# Patient Record
(demographics unavailable — no encounter records)

---

## 2025-04-29 NOTE — ASSESSMENT
[FreeTextEntry1] : In summary, Ms. MG presents with left lower extremity varicose veins. A venous duplex was performed in the office today and showed no evidence of DVT or SVT in either extremity. There is reflux in the left small saphenous vein. We will begin treatment with conservative management. I provided her a prescription for knee high 20-30 mmHg compression stockings and instructed her to wear these daily and remove at night. She should keep her legs elevated when possible and exercise regularly. She will return to follow up in September and if symptoms persist, we will proceed with radiofrequency ablation of the left small saphenous vein.   Thank you for allowing me to participate in the care of this nice woman. Please do not hesitate to contact me with any questions.

## 2025-04-29 NOTE — HISTORY OF PRESENT ILLNESS
[FreeTextEntry1] : I have just had the pleasure of seeing Ms. LEONORA MG in consultation from Dr. Caldwell for lower extremity venous insufficiency.   Ms. MG is a pleasant 53 year F who presents with a history of left lower extremity varicose veins and spider veins and cramps that occur at the end of the day. She denies any history of lower extremity edema, skin pigmentation changes or ulcers. She denies any history of DVT or SVT. She denies any symptoms of lower extremity claudication, rest pain, or tissue loss. Ms. MG has not had any prior vascular surgical intervention on her lower extremities. She works as an  and spends her days sitting for many hours. She does not presently wear compression stockings.   She denies any history of CAD, MI, CHF, CVA, TIA, CRI, or DM.   Her medical history is otherwise significant for endometrial polyp s/p D&C x3, benign left breast lesion and right breast lesion in the process of evaluation   Medications: Vitamins   Allergies: PCN   Social history: Non-smoker   FH: NC

## 2025-04-29 NOTE — PHYSICAL EXAM
[de-identified] : On physical examination the patient is in no acute distress and neurologically intact. The lungs are clear to auscultation and the heart has a regular rate and rhythm. Abdomen is benign. Bilateral femoral and pedal pulses are palpable. Varicose veins and spider veins of left posterior calf.